# Patient Record
Sex: FEMALE | Employment: OTHER | URBAN - METROPOLITAN AREA
[De-identification: names, ages, dates, MRNs, and addresses within clinical notes are randomized per-mention and may not be internally consistent; named-entity substitution may affect disease eponyms.]

---

## 2017-02-02 ENCOUNTER — TELEPHONE (OUTPATIENT)
Dept: ONCOLOGY | Facility: CLINIC | Age: 72
End: 2017-02-02

## 2017-02-02 NOTE — Clinical Note
Cancer Risk Management  Program Locations    [facility]  [facility]  [facility]  Mailing Address  Cancer Risk Management Program  [facility]  [address]  [place] [address]    New patient appointments  [phone]  February 2, 2017    Samara Chung  [address] [place] [id number]      Dear Samara,    It was a pleasure speaking with you over the phone recently regarding your family history and genetic testing options. Here is a copy of the progress note summarizing our conversation. If you have any additional questions, please feel free to call.    2/2/2017    Referring Provider: Alli South MD     At her request, I called Samara today to readdress her family history of leukemia and genetic testing for hereditary leukemia syndromes. During her appointment on 11/22/2016 at the [facility], we discussed having Samara gather more information regarding the leukemias in her family and that I would pursue a pre-authorization for the tier 1 hereditary leukemia gene test through the [facility].    Per Samara, she did not have any updates regarding the leukemias in her family history. We discussed again that the pattern of leukemias in her family does not fit a pattern typical of the currently known hereditary leukemia syndromes, as her family members were diagnosed in either their 60's or teenage years with both chronic and acute leukemias. That being said, additional information may be available in the future that does suggest her family history could be due to a hereditary leukemia syndrome.    It may also be that her family history of cancer is familial/multifactorial, meaning the leukemias were caused by a combination of genetic and environmental risk factors. If this is the case,  Samara and her other relatives may be at small increased risk for leukemia above the general population risk. Genetic testing may or may not be available in the future that can identify these small, genetic risk factors. We briefly reviewed general lifestyle recommendations that can manage/reduce cancer risk, including healthy diet, exercise, healthy weight, limited alcohol use, no smoking, etc. She is encouraged to discuss these lifestyle factors with her primary care provider. Samara verbalized understanding.     We also reviewed the pre-authorization I had pursued for the hereditary leukemia genetic test through the [facility]. Her [facility] insurance plan follows Medicare guidelines and this type of genetic testing is not a Medicare covered service. As such, it does not appear as though insurance would cover this genetic test. Samara had several questions regarding this test and how it would change her medical care, as she was curious if it would be worth paying any out of pocket cost for the test. We discussed that per a genetic counselor at the [facility], Samara's family history of leukemia is significant enough to consider genetic testing. That being said, it is not always clear if identifying a hereditary cause for Samara's family history of leukemia would change her medical care, as several (but not all) of these syndromes do not yet have published medical management/screening guidelines. This may change in the future, so it would still be reasonable for Samara to consider genetic testing if she is interested.     Samara explained that as it does not appear as though insurance would cover this testing and there is no guarantee it would change her medical care, she felt comfortable declining genetic testing at this time. I encouraged her to contact me annually to review any new information regarding hereditary leukemia. She is also encouraged to contact me if there are any changes to her family  history, as this information may change genetic testing and/or cancer screening recommendations for Samara and her family. Samara verbalized understanding and agreement with this plan.    Skyla Dean MS, [facility]  Certified Genetic Counselor  Office: [phone]  Pager: [phone]

## 2022-04-21 NOTE — TELEPHONE ENCOUNTER
2/2/2017    At her request, I called Samara today to readdress her family history of leukemia and genetic testing for hereditary leukemia syndromes. During her appointment on 11/22/2016 at the [facility], we discussed having Samara gather more information regarding the leukemias in her family and that I would pursue a pre-authorization for the tier 1 hereditary leukemia gene test through the [facility].    Per Samara, she did not have any updates regarding the leukemias in her family history. We discussed again that the pattern of leukemias in her family does not fit a pattern typical of the currently known hereditary leukemia syndromes, as her family members were diagnosed in either their 60's or teenage years with both chronic and acute leukemias. That being said, additional information may be available in the future that does suggest her family history could be due to a hereditary leukemia syndrome. It may also be that her family history of cancer is familial/multifactorial, meaning the leukemias were caused by a combination of genetic and environmental risk factors. If this is the case, Samara and her other relatives may be at small increased risk for leukemia above the general population risk. Genetic testing may or may not be available in the future that can identify these small, genetic risk factors. We briefly reviewed general lifestyle recommendations that can manage/reduce cancer risk, including healthy diet, exercise, healthy weight, limited alcohol use, no smoking, etc. She is encouraged to discuss these lifestyle factors with her primary care provider. Samara verbalized understanding.     We also reviewed the pre-authorization I had pursued for the hereditary leukemia genetic test through the [facility]. Her [facility] insurance plan follows Medicare guidelines and this type of genetic testing is not a Medicare covered service. As such, it does not appear as though insurance would  cover this genetic test. Samara had several questions regarding this test and how it would change her medical care, as she was curious if it would be worth paying any out of pocket cost for the test. We discussed that per a genetic counselor at the [facility], Samara's family history of leukemia is significant enough to consider genetic testing. That being said, it is not always clear if identifying a hereditary cause for Samara's family history of leukemia would change her medical care, as several (but not all) of these syndromes do not yet have published medical management/screening guidelines. This may change in the future, though, so it would still be reasonable for Samara to consider genetic testing if she is interested.     Samara explained that as it does not appear as though insurance would cover this testing and there is no guarantee it would change her medical care, she felt comfortable declining genetic testing at this time. I encouraged her to contact me annually to review any new information regarding hereditary leukemia. She is also encouraged to contact me if there are any changes to her family history, as this information may change genetic testing and/or cancer screening recommendations for Samara and her family. Samara verbalized understanding and agreement with this plan.    Skyla Dean MS, [facility]  Certified Genetic Counselor  Office: [phone]  Pager: [phone]   unchanged

## 2024-08-27 ENCOUNTER — TRANSCRIBE ORDERS (OUTPATIENT)
Dept: OTHER | Age: 79
End: 2024-08-27

## 2024-08-27 DIAGNOSIS — F03.A0 MILD DEMENTIA WITHOUT BEHAVIORAL DISTURBANCE, PSYCHOTIC DISTURBANCE, MOOD DISTURBANCE, OR ANXIETY, UNSPECIFIED DEMENTIA TYPE (H): Primary | ICD-10-CM

## 2025-01-30 PROBLEM — G31.84 MILD COGNITIVE IMPAIRMENT: Status: ACTIVE | Noted: 2023-06-20

## 2025-01-30 PROBLEM — F03.90 MAJOR NEUROCOGNITIVE DISORDER (H): Status: ACTIVE | Noted: 2023-06-20

## 2025-01-30 PROBLEM — Z86.14 HISTORY OF METHICILLIN RESISTANT STAPHYLOCOCCUS AUREUS INFECTION: Status: ACTIVE | Noted: 2019-07-10

## 2025-01-30 PROBLEM — F41.1 GENERALIZED ANXIETY DISORDER: Status: ACTIVE | Noted: 2019-07-03

## 2025-01-30 PROBLEM — M85.80 OSTEOPENIA: Status: ACTIVE | Noted: 2023-06-20

## 2025-01-30 PROBLEM — E78.5 HYPERLIPIDEMIA: Status: ACTIVE | Noted: 2023-07-03

## 2025-01-30 NOTE — PROGRESS NOTES
NEUROLOGY CONSULTATION NOTE       [facility] [place]  [address] [place] [address]  Tel: [phone]  Fax: [phone]  [email or web address]     Samara Chung,  1945, MRN [id number]  PCP: Alli South  Date: 2025     ASSESSMENT & PLAN     Visit Diagnosis  Major neurocognitive disorder (H)     Likely major neurocognitive disorder due to Alzheimer's dementia  79-year-old female with HODA, eating disorder, psoriasis, polymyositis, HLD who was referred for evaluation of progressive cognitive decline.  She scored 17/30 on MoCA and previously had neuropsychological testing on 8/3/2024 that suggested major neurocognitive disorder.  Her symptoms suggest the diagnosis of Alzheimer's dementia but I have recommended:    1.  MRI brain with and without contrast with neuroQUANT  2.  Increase Aricept to 10 mg daily  3.  Lab work to include folate, HIV, homocystine, MMA, RPR, TSH, B1, B12, vitamin E and   4.  If  is elevated, I will schedule her for lumbar puncture for Alzheimer's disease evaluation  5.  If above workup is unrevealing we can consider brain PET scan.  6.  Patient was told to remain socially active, do mental exercises and eat Mediterranean diet  7.  Follow-up in 3 months.    Thank you again for this referral, please feel free to contact me if you have any questions.    Anjel Aggarwal MD  [facility]     REASON FOR CONSULTATION Dementia        HISTORY OF PRESENT ILLNESS     We have been requested by Dr. Holden to evaluate Samara Chung who is a 79 year old  female for cognitive decline    Patient is a 79-year-old female with history of HODA, eating disorder, psoriasis, polymyositis, HLD who was referred for evaluation of progressive cognitive decline.  She is accompanied by her  who reports that they started noticing some cognitive issue as far back as .  This gradually progressed and in  she started  having issues with recent and remote memory.  She was making mistakes with home finances and  took over.  She has at times left the stove on and tends to neglect cleaning of the house and at time personal hygiene.  There is no history of any gait difficulty, bladder incontinence, visual or auditory hallucinations.  She had a CT of the brain in November 2024 that did not show any acute abnormality.  Neuropsychological testing in August 2024 suggested major neurocognitive disorder with generalized anxiety disorder and OCD.  She was started on Aricept and asked to follow-up with neurology.     PROBLEM LIST   Patient Active Problem List   Diagnosis    History of poliomyelitis    Psoriasis    Eating disorder    Generalized anxiety disorder    History of methicillin resistant Staphylococcus aureus infection    Hyperlipidemia    Osteopenia    Major neurocognitive disorder (H)         PAST MEDICAL & SURGICAL HISTORY     Past Medical History:   Patient  has no past medical history on file.    Surgical History:  She  has no past surgical history on file.     SOCIAL HISTORY     Reviewed, and she  reports that she has never smoked. She has never used smokeless tobacco. She reports that she does not currently use alcohol.     FAMILY HISTORY     Reviewed, and family history includes Cancer in her brother.     ALLERGIES     Allergies   Allergen Reactions    Sulfamethoxazole-Trimethoprim      SULFAMETHOXAZOLE-TRIMETHOPRIM nausea--    Rifampin Unknown and Other (See Comments)     Comment: nausea, Description:     RIFAMPIN nausea--    Trimethoprim Other (See Comments)     Comment: Nausea, Description:     TRIMETHOPRIM nausea--    Escitalopram Unknown and Other (See Comments)     Other Reaction(s) from [facility]: confusion    + nausea / impaired confusion    Rosuvastatin Other (See Comments)     She can't remember what happened but does not want to ever take this again.    Sulfamethoxazole Other (See Comments)     Comment:  "Nausea, Description:         REVIEW OF SYSTEMS     A 12 point review of system was performed and was negative except as outlined in the history of present illness.     HOME MEDICATIONS     Current Outpatient Rx   Medication Sig Dispense Refill    B Complex Vitamins (VITAMIN B COMPLEX IJ) Take 1 tablet by mouth daily.      calcium citrate (CITRACAL) 950 (200 Ca) MG tablet Take 1 tablet by mouth 2 times daily.      donepezil (ARICEPT) 10 MG tablet Take 1 tablet (10 mg) by mouth at bedtime. 90 tablet 3    fish oil-omega-3 fatty acids 500 MG capsule Take by mouth.      sertraline (ZOLOFT) 50 MG tablet Take 50 mg by mouth daily.      Vitamin D3 (CHOLECALCIFEROL) 125 MCG (5000 UT) tablet Take by mouth daily.           PHYSICAL EXAM     Vital signs  /67 (BP Location: Left arm, Patient Position: Sitting)   Pulse 56   Ht 1.626 m (5' 4\")   Wt 67.1 kg (148 lb)   BMI 25.40 kg/m      Weight:   148 lbs 0 oz      2/5/2025    12:23 PM   MOCA   Visuospatial/Executive  4   Naming 2   Attention - Digits 1   Attention - Letters 1   Attention - Subtraction 1   Language - Repeat 2   Language - Fluency  1   Abstraction 2   Delayed Recall 0   Orientation 3   Education 0   MOCA Score 17   Administered by:  Belen BROOKS CMA       Elderly cheerful lady who is alert and oriented x 3 vital signs are reviewed and documented in electronic medical record.  Neck supple.  Neurologically speech normal cranial nerves II through XII are intact she has decreased muscle  mass with normal tone strength is 5/5 reflexes are 3+ toes downgoing.  Minimal dysmetria on finger-nose testing.  She has a wide-based gait and has some difficulty with tandem walking.     PERTINENT DIAGNOSTIC STUDIES     Following studies were reviewed:     CT BRAIN 11/29/2024  No acute intracranial pathology.     NEUROPSYCHOLOGICAL TESTING 8/3/2024  Samara was seen today for neuropsychological testing and neuropsychological assessment.  Major neurocognitive disorder " (HC)  Generalized anxiety disorder  Obsessive behavior    Patient is a 79 y.o. female who was referred for neuropsychological evaluation. These findings should be considered in combination with medical history and other findings. Please see the records for the comprehensive history.     Ms. Chung has been repeatedly evaluated over the last several years. She has worried about her memory for a number of years and initially her concerns seemed more a result of her anxiety and her obsessive and perseverative thinking. This started as far back as 2015 but at that time she was scoring the the average or above range. She continued to display very anxious behavior with a lot of cognitive rumination and over attention to detail. She would often ask the same question which appeared to be more likely related to obsessively thinking and rechecking the details rather than memory.     However, over the last couple of years there has been more indication in her testing of actual decline in her memory abilities. In July of 2023, while she scored in the BORDERLINE range of functioning overall, she demonstrated impairment in both immediate and delayed memory. In addition, she displayed impairment in executive functioning. While there is understandable test - retest variability between her scores over the years her results clearly show some decline not only in her overall score but especially in her immediate memory since she was originally evaluated in 2020. Thus, the earliest stages of a MAJOR NEUROCOGNITIVE DISORDER also known as dementia were suspected. Certainly these issues can be exacerbated by her anxiety and rumination. It was recommended she return to therapy and also to consider medication for this. However there is no indication she started therapy here and she declined to start the medication when offered by her primary care provider. Again it is noted in the record she asks things over and over in spite of just being  told them. She also did not follow through with the referral to neurology.    Today Samara Chung , scored in the Impairedrange of functioning overall on neuropsychological testing. Attention Abilities were in the Average range. Immediate Memory was in the Impaired range while Delayed Memory was Impaired. Language Abilities were Low Average/Below Average while Visual Construction Abilities were Impaired. Executive Functioning/Divided Attention was SEVERELY IMPAIRED.    These scores show significant decline from her baseline functioning. Not only is there decline noted in testing completed here in 2020/2021 but there was other prior testing completed here using the Weschler Memory Scales in 2015 and 2017 where she scored in the Average to Superior range. Thus, these results clearly show substantial gradual decline in cognitive functioning in the context of also perseveration and significant anxiety over the last 9 years.     These scores, along with the history presented, are consistent with a diagnosis of MAJOR NEUROCOGNITIVE DISORDER also known as dementia . There are many underlying diseases that can cause Major Neurocognitive Disorder and currently a definitive diagnosis cannot be made until post mortem. The most likely cause(s) in this case based on the patient's history and test results include: Alzheimer's Disease or Vascular Disease . Unfortunately she has not completed the recommended MRI that may shed some insight as to the presence of vascular disease. However, this would not substantively change treatment.    Based on these findings along with the history presented, the following recommendations are made.    Patient should follow up with primary care to discuss treatment and overall medical care. Discuss taking a medication that can slow the development of further cognitive symptoms.   Patient should not drive.  At this time it is my opinion the patient no longer has decision making capacity and her power  of  should be activated. STatement of incapacity was signed and forwarded to her provider, Dr. Holden. Social work notified.  The best way to prevent cognitive decline is to eat a health diet and stay physically and mentally active. Regular exercise such as walking is most important. Dietary suggestions can be found at: https://www.healthline.com/nutrition/mind-diet. Do activities you enjoy that involve new learning and being social. You can find such activities that you can do online at [employer].   For more information on all types of dementia and resources for families and caregivers visit the website of the Alzheimer s Association at www.alz.org.  More information on understanding dementia and communication with persons with all types of dementia can be found at: http://[employer]/resources/teepa-tips-videos    These findings and recommendations were discussed in a basic manner with the patient and her . Patients reaction was a bit of confusion but superficial polite aquiescence. Her  is aware of her deficits and denies need for assistance at this time. However, he has had some health issues and we discuss they should look into options for care should either of them need it in the future.      PERTINENT LABS  Following labs were reviewed:  No visits with results within 3 Month(s) from this visit.   Latest known visit with results is:   No results found for any previous visit.        Total time spent for face to face visit, reviewing labs/imaging studies, counseling and coordination of care was: 1 Hour spent on the date of the encounter doing chart review, review of outside records, review of test results, interpretation of tests, patient visit, and documentation     The longitudinal plan of care for the diagnosis(es)/condition(s) as documented were addressed during this visit. Due to the added complexity in care, I will continue to support Samara in the subsequent management and with  ongoing continuity of care.    This note was dictated using voice recognition software.  Any grammatical or context distortions are unintentional and inherent to the software.    Orders Placed This Encounter   Procedures    MR Brain w/o & w Contrast    Folate    HIV Antigen Antibody Combo Cascade    Homocysteine    Methylmalonic Acid    [facility]; ; PHOSPHO , Plasma (Laboratory Miscellaneous Order)    Treponema Abs w Reflex to RPR and Titer    TSH with free T4 reflex    Vitamin B1 whole blood    Vitamin B12    Vitamin E      New Prescriptions    No medications on file      Modified Medications    Modified Medication Previous Medication    DONEPEZIL (ARICEPT) 10 MG TABLET donepezil (ARICEPT) 5 MG tablet       Take 1 tablet (10 mg) by mouth at bedtime.    Take 1 tablet by mouth at bedtime.

## 2025-02-05 ENCOUNTER — OFFICE VISIT (OUTPATIENT)
Dept: NEUROLOGY | Facility: CLINIC | Age: 80
End: 2025-02-05
Payer: COMMERCIAL

## 2025-02-05 ENCOUNTER — LAB (OUTPATIENT)
Dept: LAB | Facility: HOSPITAL | Age: 80
End: 2025-02-05
Payer: COMMERCIAL

## 2025-02-05 VITALS
HEIGHT: 64 IN | WEIGHT: 148 LBS | HEART RATE: 56 BPM | DIASTOLIC BLOOD PRESSURE: 67 MMHG | SYSTOLIC BLOOD PRESSURE: 128 MMHG | BODY MASS INDEX: 25.27 KG/M2

## 2025-02-05 DIAGNOSIS — F03.90 MAJOR NEUROCOGNITIVE DISORDER (H): Primary | ICD-10-CM

## 2025-02-05 DIAGNOSIS — F03.90 MAJOR NEUROCOGNITIVE DISORDER (H): ICD-10-CM

## 2025-02-05 LAB
FOLATE SERPL-MCNC: 11.9 NG/ML (ref 4.6–34.8)
HCYS SERPL-SCNC: 12 UMOL/L (ref 0–15)
HIV 1+2 AB+HIV1 P24 AG SERPL QL IA: NONREACTIVE
Lab: NORMAL
PERFORMING LABORATORY: NORMAL
SPECIMEN STATUS: NORMAL
T PALLIDUM AB SER QL: NONREACTIVE
TEST NAME: NORMAL
TSH SERPL DL<=0.005 MIU/L-ACNC: 1.45 UIU/ML (ref 0.3–4.2)
VIT B12 SERPL-MCNC: 649 PG/ML (ref 232–1245)

## 2025-02-05 PROCEDURE — 36415 COLL VENOUS BLD VENIPUNCTURE: CPT

## 2025-02-05 PROCEDURE — 83520 IMMUNOASSAY QUANT NOS NONAB: CPT

## 2025-02-05 PROCEDURE — 87389 HIV-1 AG W/HIV-1&-2 AB AG IA: CPT

## 2025-02-05 PROCEDURE — 82746 ASSAY OF FOLIC ACID SERUM: CPT

## 2025-02-05 PROCEDURE — 99205 OFFICE O/P NEW HI 60 MIN: CPT | Performed by: PSYCHIATRY & NEUROLOGY

## 2025-02-05 PROCEDURE — 84446 ASSAY OF VITAMIN E: CPT

## 2025-02-05 PROCEDURE — 84425 ASSAY OF VITAMIN B-1: CPT

## 2025-02-05 PROCEDURE — G2211 COMPLEX E/M VISIT ADD ON: HCPCS | Performed by: PSYCHIATRY & NEUROLOGY

## 2025-02-05 PROCEDURE — 83921 ORGANIC ACID SINGLE QUANT: CPT

## 2025-02-05 PROCEDURE — 82607 VITAMIN B-12: CPT

## 2025-02-05 PROCEDURE — 84443 ASSAY THYROID STIM HORMONE: CPT

## 2025-02-05 PROCEDURE — 83090 ASSAY OF HOMOCYSTEINE: CPT

## 2025-02-05 PROCEDURE — 86780 TREPONEMA PALLIDUM: CPT

## 2025-02-05 RX ORDER — DONEPEZIL HYDROCHLORIDE 5 MG/1
1 TABLET, FILM COATED ORAL AT BEDTIME
COMMUNITY
Start: 2024-11-19 | End: 2025-02-05

## 2025-02-05 RX ORDER — OMEGA-3/DHA/EPA/FISH OIL 60 MG-90MG
CAPSULE ORAL
COMMUNITY

## 2025-02-05 RX ORDER — DONEPEZIL HYDROCHLORIDE 10 MG/1
10 TABLET, FILM COATED ORAL AT BEDTIME
Qty: 90 TABLET | Refills: 3 | Status: SHIPPED | OUTPATIENT
Start: 2025-02-05

## 2025-02-05 RX ORDER — IBUPROFEN 200 MG
1 CAPSULE ORAL 2 TIMES DAILY
COMMUNITY

## 2025-02-05 ASSESSMENT — MONTREAL COGNITIVE ASSESSMENT (MOCA)
WHAT LEVEL OF EDUCATION WAS ATTAINED: 0
VISUOSPATIAL/EXECUTIVE SUBSCORE: 4
11. FOR EACH PAIR OF WORDS, WHAT CATEGORY DO THEY BELONG TO (OUT OF 2): 2
7. [VIGILENCE] TAP WHEN HEARING DESIGNATED LETTER: 1
4. NAME EACH OF THE THREE ANIMALS SHOWN: 2
9. REPEAT EACH SENTENCE: 2
13. ORIENTATION SUBSCORE: 3
WHAT IS THE TOTAL SCORE (OUT OF 30): 17
8. SERIAL SUBTRACTION OF 7S: 1
10. [FLUENCY] NAME WORDS STARTING WITH DESIGNATED LETTER: 1
6. READ LIST OF DIGITS [FORWARD/BACKWARD]: 1
12. MEMORY INDEX SCORE: 0

## 2025-02-05 NOTE — NURSING NOTE
Chief Complaint   Patient presents with    Dementia     MoCA 17/30     Belen BROOKS CMA on 2/5/2025 at 12:39 PM  [facility]

## 2025-02-05 NOTE — PATIENT INSTRUCTIONS
Instructions for Blood Draw    Hours:   [facility]: M-F 7am-5pm, Saturday- 9am-1pm No appointment is needed.  [facility]: M-F 7am-5pm, Saturday & - 9am-12pm No appointment is needed.  Schedule Lab Appointments through [email or web address] or by [phone] ([phone])    [facility] (2nd floor) or [place] ([facility])

## 2025-02-05 NOTE — LETTER
2025      Samara Chung  [address] [address]      Dear Colleague,    Thank you for referring your patient, Samara Chung, to the [facility] NEUR[facility]INIC [place]. Please see a copy of my visit note below.    NEUROLOGY CONSULTATION NOTE       [facility] NEUROLOGY [place]  [address] [place] [address]  Tel: [phone]  Fax: [phone]  [email or web address]     Samara Chung,  1945, MRN [id number]  PCP: Alli South  Date: 2025     ASSESSMENT & PLAN     Visit Diagnosis  Major neurocognitive disorder (H)     Likely major neurocognitive disorder due to Alzheimer's dementia  79-year-old female with HODA, eating disorder, psoriasis, polymyositis, HLD who was referred for evaluation of progressive cognitive decline.  She scored 17/30 on MoCA and previously had neuropsychological testing on 8/3/2024 that suggested major neurocognitive disorder.  Her symptoms suggest the diagnosis of Alzheimer's dementia but I have recommended:    1.  MRI brain with and without contrast with neuroQUANT  2.  Increase Aricept to 10 mg daily  3.  Lab work to include folate, HIV, homocystine, MMA, RPR, TSH, B1, B12, vitamin E and   4.  If  is elevated, I will schedule her for lumbar puncture for Alzheimer's disease evaluation  5.  If above workup is unrevealing we can consider brain PET scan.  6.  Patient was told to remain socially active, do mental exercises and eat Mediterranean diet  7.  Follow-up in 3 months.    Thank you again for this referral, please feel free to contact me if you have any questions.    Anjel Aggarwal MD  [facility] NEUROLOGY, [place]     REASON FOR CONSULTATION Dementia        HISTORY OF PRESENT ILLNESS     We have been requested by Dr. Holden to evaluate Samara Chung who is a 79 year old  female for cognitive decline    Patient is a 79-year-old female with history of HODA, eating disorder,  psoriasis, polymyositis, HLD who was referred for evaluation of progressive cognitive decline.  She is accompanied by her  who reports that they started noticing some cognitive issue as far back as 2015.  This gradually progressed and in 2023 she started having issues with recent and remote memory.  She was making mistakes with home finances and  took over.  She has at times left the stove on and tends to neglect cleaning of the house and at time personal hygiene.  There is no history of any gait difficulty, bladder incontinence, visual or auditory hallucinations.  She had a CT of the brain in November 2024 that did not show any acute abnormality.  Neuropsychological testing in August 2024 suggested major neurocognitive disorder with generalized anxiety disorder and OCD.  She was started on Aricept and asked to follow-up with neurology.     PROBLEM LIST   Patient Active Problem List   Diagnosis     History of poliomyelitis     Psoriasis     Eating disorder     Generalized anxiety disorder     History of methicillin resistant Staphylococcus aureus infection     Hyperlipidemia     Osteopenia     Major neurocognitive disorder (H)         PAST MEDICAL & SURGICAL HISTORY     Past Medical History:   Patient  has no past medical history on file.    Surgical History:  She  has no past surgical history on file.     SOCIAL HISTORY     Reviewed, and she  reports that she has never smoked. She has never used smokeless tobacco. She reports that she does not currently use alcohol.     FAMILY HISTORY     Reviewed, and family history includes Cancer in her brother.     ALLERGIES     Allergies   Allergen Reactions     Sulfamethoxazole-Trimethoprim      SULFAMETHOXAZOLE-TRIMETHOPRIM nausea--     Rifampin Unknown and Other (See Comments)     Comment: nausea, Description:     RIFAMPIN nausea--     Trimethoprim Other (See Comments)     Comment: Nausea, Description:     TRIMETHOPRIM nausea--     Escitalopram Unknown and  "Other (See Comments)     Other Reaction(s) from [facility]: confusion    + nausea / impaired confusion     Rosuvastatin Other (See Comments)     She can't remember what happened but does not want to ever take this again.     Sulfamethoxazole Other (See Comments)     Comment: Nausea, Description:         REVIEW OF SYSTEMS     A 12 point review of system was performed and was negative except as outlined in the history of present illness.     HOME MEDICATIONS     Current Outpatient Rx   Medication Sig Dispense Refill     B Complex Vitamins (VITAMIN B COMPLEX IJ) Take 1 tablet by mouth daily.       calcium citrate (CITRACAL) 950 (200 Ca) MG tablet Take 1 tablet by mouth 2 times daily.       donepezil (ARICEPT) 10 MG tablet Take 1 tablet (10 mg) by mouth at bedtime. 90 tablet 3     fish oil-omega-3 fatty acids 500 MG capsule Take by mouth.       sertraline (ZOLOFT) 50 MG tablet Take 50 mg by mouth daily.       Vitamin D3 (CHOLECALCIFEROL) 125 MCG (5000 UT) tablet Take by mouth daily.           PHYSICAL EXAM     Vital signs  /67 (BP Location: Left arm, Patient Position: Sitting)   Pulse 56   Ht 1.626 m (5' 4\")   Wt 67.1 kg (148 lb)   BMI 25.40 kg/m      Weight:   148 lbs 0 oz      2/5/2025    12:23 PM   MOCA   Visuospatial/Executive  4   Naming 2   Attention - Digits 1   Attention - Letters 1   Attention - Subtraction 1   Language - Repeat 2   Language - Fluency  1   Abstraction 2   Delayed Recall 0   Orientation 3   Education 0   MOCA Score 17   Administered by:  Belen BROOKS CMA       Elderly cheerful lady who is alert and oriented x 3 vital signs are reviewed and documented in electronic medical record.  Neck supple.  Neurologically speech normal cranial nerves II through XII are intact she has decreased muscle  mass with normal tone strength is 5/5 reflexes are 3+ toes downgoing.  Minimal dysmetria on finger-nose testing.  She has a wide-based gait and has some difficulty with tandem walking.     PERTINENT " DIAGNOSTIC STUDIES     Following studies were reviewed:     CT BRAIN 11/29/2024  No acute intracranial pathology.     NEUROPSYCHOLOGICAL TESTING 8/3/2024  Samara was seen today for neuropsychological testing and neuropsychological assessment.  Major neurocognitive disorder (HC)  Generalized anxiety disorder  Obsessive behavior    Patient is a 79 y.o. female who was referred for neuropsychological evaluation. These findings should be considered in combination with medical history and other findings. Please see the records for the comprehensive history.     Ms. Chung has been repeatedly evaluated over the last several years. She has worried about her memory for a number of years and initially her concerns seemed more a result of her anxiety and her obsessive and perseverative thinking. This started as far back as 2015 but at that time she was scoring the the average or above range. She continued to display very anxious behavior with a lot of cognitive rumination and over attention to detail. She would often ask the same question which appeared to be more likely related to obsessively thinking and rechecking the details rather than memory.     However, over the last couple of years there has been more indication in her testing of actual decline in her memory abilities. In July of 2023, while she scored in the BORDERLINE range of functioning overall, she demonstrated impairment in both immediate and delayed memory. In addition, she displayed impairment in executive functioning. While there is understandable test - retest variability between her scores over the years her results clearly show some decline not only in her overall score but especially in her immediate memory since she was originally evaluated in 2020. Thus, the earliest stages of a MAJOR NEUROCOGNITIVE DISORDER also known as dementia were suspected. Certainly these issues can be exacerbated by her anxiety and rumination. It was recommended she return to  therapy and also to consider medication for this. However there is no indication she started therapy here and she declined to start the medication when offered by her primary care provider. Again it is noted in the record she asks things over and over in spite of just being told them. She also did not follow through with the referral to neurology.    Today Samara Chung , scored in the Impairedrange of functioning overall on neuropsychological testing. Attention Abilities were in the Average range. Immediate Memory was in the Impaired range while Delayed Memory was Impaired. Language Abilities were Low Average/Below Average while Visual Construction Abilities were Impaired. Executive Functioning/Divided Attention was SEVERELY IMPAIRED.    These scores show significant decline from her baseline functioning. Not only is there decline noted in testing completed here in 2020/2021 but there was other prior testing completed here using the Weschler Memory Scales in 2015 and 2017 where she scored in the Average to Superior range. Thus, these results clearly show substantial gradual decline in cognitive functioning in the context of also perseveration and significant anxiety over the last 9 years.     These scores, along with the history presented, are consistent with a diagnosis of MAJOR NEUROCOGNITIVE DISORDER also known as dementia . There are many underlying diseases that can cause Major Neurocognitive Disorder and currently a definitive diagnosis cannot be made until post mortem. The most likely cause(s) in this case based on the patient's history and test results include: Alzheimer's Disease or Vascular Disease . Unfortunately she has not completed the recommended MRI that may shed some insight as to the presence of vascular disease. However, this would not substantively change treatment.    Based on these findings along with the history presented, the following recommendations are made.    Patient should follow up  with primary care to discuss treatment and overall medical care. Discuss taking a medication that can slow the development of further cognitive symptoms.   Patient should not drive.  At this time it is my opinion the patient no longer has decision making capacity and her power of  should be activated. STatement of incapacity was signed and forwarded to her provider, Dr. Holden. Social work notified.  The best way to prevent cognitive decline is to eat a health diet and stay physically and mentally active. Regular exercise such as walking is most important. Dietary suggestions can be found at: https://www.[employer].com/nutrition/mind-diet. Do activities you enjoy that involve new learning and being social. You can find such activities that you can do online at [employer].   For more information on all types of dementia and resources for families and caregivers visit the website of the Alzheimer s Association at www.alz.org.  More information on understanding dementia and communication with persons with all types of dementia can be found at: http://[employer].Proteopure/resources/teepa-tips-videos    These findings and recommendations were discussed in a basic manner with the patient and her . Patients reaction was a bit of confusion but superficial polite aquiescence. Her  is aware of her deficits and denies need for assistance at this time. However, he has had some health issues and we discuss they should look into options for care should either of them need it in the future.      PERTINENT LABS  Following labs were reviewed:  No visits with results within 3 Month(s) from this visit.   Latest known visit with results is:   No results found for any previous visit.        Total time spent for face to face visit, reviewing labs/imaging studies, counseling and coordination of care was: 1 Hour spent on the date of the encounter doing chart review, review of outside records, review of test results,  interpretation of tests, patient visit, and documentation     The longitudinal plan of care for the diagnosis(es)/condition(s) as documented were addressed during this visit. Due to the added complexity in care, I will continue to support Samara in the subsequent management and with ongoing continuity of care.    This note was dictated using voice recognition software.  Any grammatical or context distortions are unintentional and inherent to the software.    Orders Placed This Encounter   Procedures     MR Brain w/o & w Contrast     Folate     HIV Antigen Antibody Combo Cascade     Homocysteine     Methylmalonic Acid     [facility]; ; PHOSPHO , Plasma (Laboratory Miscellaneous Order)     Treponema Abs w Reflex to RPR and Titer     TSH with free T4 reflex     Vitamin B1 whole blood     Vitamin B12     Vitamin E      New Prescriptions    No medications on file      Modified Medications    Modified Medication Previous Medication    DONEPEZIL (ARICEPT) 10 MG TABLET donepezil (ARICEPT) 5 MG tablet       Take 1 tablet (10 mg) by mouth at bedtime.    Take 1 tablet by mouth at bedtime.                Again, thank you for allowing me to participate in the care of your patient.        Sincerely,        Anjel Aggarwal MD    Electronically signed

## 2025-02-05 NOTE — NURSING NOTE
JEROMY COGNITIVE ASSESSMENT (MOCA)  Version 7.1 Original Version  VISUOSPATIAL/EXECUTIVE               COPY CUBE      [  0  ]                                [ 1  ] DRAW CLOCK (Ten past eleven)  (3 points)    [ 1   ]                    [ 1   ]               [   1 ]       Contour            Numbers     Hands POINTS           3        / 5   NAMING    [  1 ]                                                                        [ 0   ]                                             [   1 ]  Limadyson Weeks                                Camel                2     / 3   MEMORY Read list of words, subject must repeat them. Do 2 trials, even if 1st trial is successful. Do a recall after 5 minutes  FACE VELVET Taoist LYNNETTE RED No Points    1st x x x  x     2nd  x x x x    ATTENTION Read list of digits (1 digit/sec) Subject has to repeat in the forward order       [  1  ]   2  1  8  5  4                                [  0  ] 7 4 2                      1    /2   Read list of letters. The subject must tap with his hand at each letter A. No points if > 2 errors.  [  1  ] F B A C M N A A J K L B A F A K D E A A A J A M O F A A B          1    /1   Serial 7 subtraction starting at 100          [ 1   ] 93         [    ] 86          [    ] 79          [    ] 72         [    ] 65   4 or 5 correct subtractions: 3 points,  2 or 3 correct: 2 points,  1correct: 1 point,   0 correct: 0 points       1     /3   LANGUAGE Repeat: I only know that Yo is the one to help today. [  1   ]                                      The cat always hid under the couch when dogs were in the room. [ 1  ]          2     /2   Fluency: Name maximum number of words in one minute that begin with the letter F                                                                                                                    [ 1   ] _11__ (N > 11 words)        1 /1   ABSTRACTION  Similarity between e.g. banana-orange=fruit                                                                   [  1  ] train-bicycle                      [1    ] watch-ruler           2   /2   DELAYED  RECALL Has to recall words  WITH NO CUE FACE  [ 0   ] VELVET  [  0  ] Yarsani  [   0 ]  LYNNETTE  [ 0   ] RED  [ 0   ] Points for UNCUED recall only       0     /5           OPTIONAL Category cue           Multiple choice cue          ORIENTATION  [  0  ] Date     [  1  ] Month       [  1  ] Year      [  0  ] Day      [ 1   ] Place        [ 0   ] City     3     /6   TOTAL  Normal > 26/30 Add 1 point if < 12 years education     17 /30

## 2025-02-06 LAB — MAYO MISC RESULT: ABNORMAL

## 2025-02-08 LAB
A-TOCOPHEROL VIT E SERPL-MCNC: 14.7 MG/L
BETA+GAMMA TOCOPHEROL SERPL-MCNC: 0.4 MG/L

## 2025-02-09 LAB — VIT B1 PYROPHOSHATE BLD-SCNC: 171 NMOL/L

## 2025-02-12 ENCOUNTER — TELEPHONE (OUTPATIENT)
Dept: NEUROLOGY | Facility: CLINIC | Age: 80
End: 2025-02-12
Payer: COMMERCIAL

## 2025-02-12 LAB — METHYLMALONATE SERPL-SCNC: 0.21 UMOL/L (ref 0–0.4)

## 2025-02-12 NOTE — TELEPHONE ENCOUNTER
RN returned call to Artem, patients spouse (ctc on file). He reports that MRI brain is scheduled for 2/24. Asking about recommended timeframe for follow up, RN recommended 3 months per LOV note. Artem will return call to clinic to schedule follow up.     He is asking about recommendation for increasing Aricept to 10mg, RN advised that medication's goal is to preserve current state of cognition but that pt will still eventually have decline. He verbalizes understanding. RN advised to watch for GI upset- nausea, vomiting, diarrhea and report to clinic if she is having symptoms that are persistent/affecting her daily. Artem verbalizes understanding.      Eric QUEVEDO RN, BSN  [facility] Neurology

## 2025-02-12 NOTE — TELEPHONE ENCOUNTER
Health Call Center    Phone Message    May a detailed message be left on voicemail: yes     Reason for Call: Other:       Patient's , Artem, requesting call back from care team or Dr Aggarwal. Artem states he has questions about most recent lab results, an MRI and medication questions.   Artem's call back #[phone]    Action Taken: Message routed to:  Other: [facility]    Travel Screening: Not Applicable

## 2025-02-13 DIAGNOSIS — F03.90 MAJOR NEUROCOGNITIVE DISORDER (H): Primary | ICD-10-CM

## 2025-02-13 NOTE — TELEPHONE ENCOUNTER
Spoke with Artem and scheduled follow up for 3/5/25 with Dr. Alessia BROOKS CMA on 2/13/2025 at 2:31 PM  [facility]

## 2025-02-13 NOTE — TELEPHONE ENCOUNTER
Anjel Aggarwal MD  [facility]  Cc: Belen Mendiola, [facility]   is elevated.  As we had discussed during your last visit, after MRI scan we need a lumbar puncture to look for changes in the spinal fluid usually seen in patients with Alzheimer's dementia.

## 2025-02-13 NOTE — TELEPHONE ENCOUNTER
Returned call to Artem, relayed provider note below. He verbalizes understanding. Would like to defer LP.     Patient scheduled 2/24 for MRI.     Artem asking for assistance in scheduling follow up with Dr. Aggarwal. Tiff can you assist in scheduling this?     Eric QUEVEDO, RN, BSN  [facility] Neurology

## 2025-02-13 NOTE — TELEPHONE ENCOUNTER
It will help confirm the diagnosis of Alzheimer's dementia.  Clinically I strongly suspect we are dealing with Alzheimer's dementia and therefore had recommended increasing the dose of Aricept to 10 mg daily during the last visit.  If they want to hold off on lumbar puncture it is okay for neurology standpoint

## 2025-02-13 NOTE — TELEPHONE ENCOUNTER
"RN returned call to Artem, patients spouse. We discussed elevated  level and recommendation for lumbar puncture. He is asking if they can defer this testing, stating \"is this really going to tell us anything we don't already know or assume.\" He would like to minimize testing as able.     Please advise.     Eric QUEVEDO RN, BSN  [facility] Neurology     "

## 2025-02-13 NOTE — RESULT ENCOUNTER NOTE
is elevated.  As we had discussed during your last visit, after MRI scan we need a lumbar puncture to look for changes in the spinal fluid usually seen in patients with Alzheimer's dementia.

## 2025-02-18 PROBLEM — F02.80 MAJOR NEUROCOGNITIVE DISORDER DUE TO ALZHEIMER DISEASE (H): Status: ACTIVE | Noted: 2023-06-20

## 2025-02-18 PROBLEM — G30.9 MAJOR NEUROCOGNITIVE DISORDER DUE TO ALZHEIMER DISEASE (H): Status: ACTIVE | Noted: 2023-06-20

## 2025-03-05 NOTE — PROGRESS NOTES
NEUROLOGY FOLLOW UP VISIT  NOTE       [facility] NEUROLOGY [place]  [address] [place] [address]  Tel: [phone]  Fax: [phone]  [email or web address]     Samara Chung,  1945, MRN [id number]  PCP: Ellie Marcus MD  Date: 2025      ASSESSMENT & PLAN     Visit Diagnosis  Major neurocognitive disorder due to Alzheimer disease (H)     Major neurocognitive disorder due to Alzheimer's dementia  79-year-old female with HODA, eating disorder, psoriasis, polymyositis, HLD was evaluated for progressive cognitive decline.  Workup including MRI brain with neuroQUANT, neuropsychological testing and  suggest the diagnosis of major neurocognitive disorder due to Alzheimer's dementia.  Rest of the lab work was normal.  Although I had recommended a lumbar puncture for Alzheimer's disease evaluation,  wanted to hold off as he felt it will not add any meaningful information.  I have recommended:    1.  Continue Aricept 10 mg daily  2.  Add vitamin E 1000 unit twice daily  3.  Mediterranean diet  4.  Patient was told to remain socially active to mental exercises  5.  Follow-up in 1 year    Thank you again for this referral, please feel free to contact me if you have any questions.    Anjel Aggarwal MD  [facility]     HISTORY OF PRESENT ILLNESS     Patient is 79-year-old female with HODA, eating disorder, psoriasis, polymyositis, HLD who was initially evaluated on 2025 for progressive cognitive decline.  She scored 17/30 on MoCA and previous neuropsychological testing suggested major neurocognitive disorder.  She had MRI of the brain on 2025 with neuroQUANT that suggested generalized atrophy.  Lab work included normal HIV, folate, homocystine, MMA, RPR, TSH, B1, B12, vitamin E.   was elevated and I had recommended lumbar puncture but family wanted to defer as they did not think it will add anything meaningful that we already do not  know.  As I strongly suspected Alzheimer's dementia I told them it is okay to hold off on a lumbar puncture.  Family reports ongoing difficulty with her memory she tends to struggle with short-term memory.  She has not done anything that puts her other people at risk.     PROBLEM LIST   Patient Active Problem List   Diagnosis    History of poliomyelitis    Psoriasis    Eating disorder    Generalized anxiety disorder    History of methicillin resistant Staphylococcus aureus infection    Hyperlipidemia    Osteopenia    Major neurocognitive disorder due to Alzheimer disease (H)         PAST MEDICAL & SURGICAL HISTORY     Past Medical History:   Patient  has no past medical history on file.    Surgical History:  She  has no past surgical history on file.     SOCIAL HISTORY     Reviewed, and she  reports that she has never smoked. She has never used smokeless tobacco. She reports that she does not currently use alcohol.     FAMILY HISTORY     Reviewed, and family history includes Cancer in her brother.     ALLERGIES     Allergies   Allergen Reactions    Sulfamethoxazole-Trimethoprim      SULFAMETHOXAZOLE-TRIMETHOPRIM nausea--    Rifampin Unknown and Other (See Comments)     Comment: nausea, Description:     RIFAMPIN nausea--    Trimethoprim Other (See Comments)     Comment: Nausea, Description:     TRIMETHOPRIM nausea--    Escitalopram Unknown and Other (See Comments)     Other Reaction(s) from [facility]: confusion    + nausea / impaired confusion    Rosuvastatin Other (See Comments)     She can't remember what happened but does not want to ever take this again.    Sulfamethoxazole Other (See Comments)     Comment: Nausea, Description:         REVIEW OF SYSTEMS     A 12 point review of system was performed and was negative except as outlined in the history of present illness.     HOME MEDICATIONS     Current Outpatient Rx   Medication Sig Dispense Refill    B Complex Vitamins (VITAMIN B COMPLEX IJ) Take 1 tablet by  "mouth daily.      calcium citrate (CITRACAL) 950 (200 Ca) MG tablet Take 1 tablet by mouth 2 times daily.      donepezil (ARICEPT) 10 MG tablet Take 1 tablet (10 mg) by mouth at bedtime. 90 tablet 3    fish oil-omega-3 fatty acids 500 MG capsule Take by mouth.      sertraline (ZOLOFT) 50 MG tablet Take 50 mg by mouth daily.      Vitamin D3 (CHOLECALCIFEROL) 125 MCG (5000 UT) tablet Take by mouth daily.      vitamin E (TOCOPHEROL) 1000 units (450 mg) CAPS capsule Take 1 capsule (1,000 Units) by mouth 2 times daily. 60 capsule 11         PHYSICAL EXAM     Vital signs  /73 (BP Location: Left arm, Patient Position: Sitting)   Pulse 53   Ht 1.626 m (5' 4\")   Wt 67.1 kg (148 lb)   BMI 25.40 kg/m      Weight:   148 lbs 0 oz    Elderly cheerful lady who is alert and oriented x 3 vital signs are reviewed and documented in electronic medical record. Neck supple. Neurologically speech normal cranial nerves II through XII are intact she has decreased muscle mass with normal tone strength is 5/5 reflexes are 3+ toes downgoing. Minimal dysmetria on finger-nose testing. She has a wide-based gait and has some difficulty with tandem walking.      PERTINENT DIAGNOSTIC STUDIES     Following studies were reviewed:     CT BRAIN 11/29/2024  No acute intracranial pathology.      MRI BRAIN 2/24/2025  1. Mild small vessel ischemic white matter signal changes and cerebral volume loss.  2. Otherwise normal brain exam.    NEUROPSYCHOLOGICAL TESTING 8/3/2024  Samara was seen today for neuropsychological testing and neuropsychological assessment.  Major neurocognitive disorder (HC)  Generalized anxiety disorder  Obsessive behavior  Patient is a 79 y.o. female who was referred for neuropsychological evaluation. These findings should be considered in combination with medical history and other findings. Please see the records for the comprehensive history.     Ms. Chung has been repeatedly evaluated over the last several years. She has " worried about her memory for a number of years and initially her concerns seemed more a result of her anxiety and her obsessive and perseverative thinking. This started as far back as 2015 but at that time she was scoring the the average or above range. She continued to display very anxious behavior with a lot of cognitive rumination and over attention to detail. She would often ask the same question which appeared to be more likely related to obsessively thinking and rechecking the details rather than memory.     However, over the last couple of years there has been more indication in her testing of actual decline in her memory abilities. In July of 2023, while she scored in the BORDERLINE range of functioning overall, she demonstrated impairment in both immediate and delayed memory. In addition, she displayed impairment in executive functioning. While there is understandable test - retest variability between her scores over the years her results clearly show some decline not only in her overall score but especially in her immediate memory since she was originally evaluated in 2020. Thus, the earliest stages of a MAJOR NEUROCOGNITIVE DISORDER also known as dementia were suspected. Certainly these issues can be exacerbated by her anxiety and rumination. It was recommended she return to therapy and also to consider medication for this. However there is no indication she started therapy here and she declined to start the medication when offered by her primary care provider. Again it is noted in the record she asks things over and over in spite of just being told them. She also did not follow through with the referral to neurology.    Today Samara Chung , scored in the Impairedrange of functioning overall on neuropsychological testing. Attention Abilities were in the Average range. Immediate Memory was in the Impaired range while Delayed Memory was Impaired. Language Abilities were Low Average/Below Average while  Visual Construction Abilities were Impaired. Executive Functioning/Divided Attention was SEVERELY IMPAIRED.    These scores show significant decline from her baseline functioning. Not only is there decline noted in testing completed here in 2020/2021 but there was other prior testing completed here using the Weschler Memory Scales in 2015 and 2017 where she scored in the Average to Superior range. Thus, these results clearly show substantial gradual decline in cognitive functioning in the context of also perseveration and significant anxiety over the last 9 years.     These scores, along with the history presented, are consistent with a diagnosis of MAJOR NEUROCOGNITIVE DISORDER also known as dementia . There are many underlying diseases that can cause Major Neurocognitive Disorder and currently a definitive diagnosis cannot be made until post mortem. The most likely cause(s) in this case based on the patient's history and test results include: Alzheimer's Disease or Vascular Disease . Unfortunately she has not completed the recommended MRI that may shed some insight as to the presence of vascular disease. However, this would not substantively change treatment.    Based on these findings along with the history presented, the following recommendations are made.    Patient should follow up with primary care to discuss treatment and overall medical care. Discuss taking a medication that can slow the development of further cognitive symptoms.   Patient should not drive.  At this time it is my opinion the patient no longer has decision making capacity and her power of  should be activated. STatement of incapacity was signed and forwarded to her provider, Dr. Holden. Social work notified.  The best way to prevent cognitive decline is to eat a health diet and stay physically and mentally active. Regular exercise such as walking is most important. Dietary suggestions can be found at:  https://www.[employer].com/nutrition/mind-diet. Do activities you enjoy that involve new learning and being social. You can find such activities that you can do online at [employer].   For more information on all types of dementia and resources for families and caregivers visit the website of the Alzheimer s Association at www.alz.org.  More information on understanding dementia and communication with persons with all types of dementia can be found at: http://[employer].com/resources/teepa-tips-videos    These findings and recommendations were discussed in a basic manner with the patient and her . Patients reaction was a bit of confusion but superficial polite aquiescence. Her  is aware of her deficits and denies need for assistance at this time. However, he has had some health issues and we discuss they should look into options for care should either of them need it in the future.      PERTINENT LABS  Following labs were reviewed:  ; PHOSPHO , Plasma [place] Miscellaneous Test (02/05/2025 1:42 PM)     Lab on 02/05/2025   Component Date Value Ref Range Status    Folic Acid 02/05/2025 11.9  4.6 - 34.8 ng/mL Final    HIV Antigen Antibody Combo 02/05/2025 Nonreactive  Nonreactive Final    Homocysteine 02/05/2025 12.0  0.0 - 15.0 umol/L Final    Methylmalonic Acid 02/05/2025 0.21  0.00 - 0.40 umol/L Final    Specimen Status 02/05/2025 Specimen received. Reordered and sent to performing laboratory. Report to follow upon completion.   Final    Performing Laboratory 02/05/2025 [facility] [employer]   Final    Test Name 02/05/2025 Phospho-Tau 217, Plasma   Final    Test Code 02/05/2025    Final    Treponema Antibody Total 02/05/2025 Nonreactive  Nonreactive Final    TSH 02/05/2025 1.45  0.30 - 4.20 uIU/mL Final    Vitamin B1 Whole Blood Level 02/05/2025 171  70 - 180 nmol/L Final    Vitamin B12 02/05/2025 649  232 - 1,245 pg/mL Final    Vitamin E 02/05/2025 14.7  5.5 - 18.0 mg/L Final     Vitamin E Gamma 02/05/2025 0.4  0.0 - 6.0 mg/L Final    Bosque Result 02/05/2025 SEE NOTE (A)   Final         Total time spent for face to face visit, reviewing labs/imaging studies, counseling and coordination of care was: 45 Minutes spent on the date of the encounter doing chart review, review of outside records, review of test results, interpretation of tests, patient visit, and documentation     The longitudinal plan of care for the diagnosis(es)/condition(s) as documented were addressed during this visit. Due to the added complexity in care, I will continue to support Samara in the subsequent management and with ongoing continuity of care.    This note was dictated using voice recognition software.  Any grammatical or context distortions are unintentional and inherent to the software.    No orders of the defined types were placed in this encounter.     New Prescriptions    VITAMIN E (TOCOPHEROL) 1000 UNITS (450 MG) CAPS CAPSULE    Take 1 capsule (1,000 Units) by mouth 2 times daily.     Modified Medications    No medications on file

## 2025-03-19 ENCOUNTER — OFFICE VISIT (OUTPATIENT)
Dept: NEUROLOGY | Facility: CLINIC | Age: 80
End: 2025-03-19
Payer: COMMERCIAL

## 2025-03-19 VITALS
WEIGHT: 148 LBS | HEIGHT: 64 IN | BODY MASS INDEX: 25.27 KG/M2 | SYSTOLIC BLOOD PRESSURE: 135 MMHG | DIASTOLIC BLOOD PRESSURE: 73 MMHG | HEART RATE: 53 BPM

## 2025-03-19 DIAGNOSIS — G30.9 MAJOR NEUROCOGNITIVE DISORDER DUE TO ALZHEIMER DISEASE (H): Primary | ICD-10-CM

## 2025-03-19 DIAGNOSIS — F02.80 MAJOR NEUROCOGNITIVE DISORDER DUE TO ALZHEIMER DISEASE (H): Primary | ICD-10-CM

## 2025-03-19 PROCEDURE — 99214 OFFICE O/P EST MOD 30 MIN: CPT | Performed by: PSYCHIATRY & NEUROLOGY

## 2025-03-19 PROCEDURE — G2211 COMPLEX E/M VISIT ADD ON: HCPCS | Performed by: PSYCHIATRY & NEUROLOGY

## 2025-03-19 PROCEDURE — 3075F SYST BP GE 130 - 139MM HG: CPT | Performed by: PSYCHIATRY & NEUROLOGY

## 2025-03-19 PROCEDURE — 3078F DIAST BP <80 MM HG: CPT | Performed by: PSYCHIATRY & NEUROLOGY

## 2025-03-19 RX ORDER — MULTIVIT WITH MINERALS/LUTEIN
1000 TABLET ORAL 2 TIMES DAILY
Qty: 60 CAPSULE | Refills: 11 | Status: SHIPPED | OUTPATIENT
Start: 2025-03-19

## 2025-03-19 NOTE — LETTER
3/19/2025      Samara Chung  [address] [place] [id number]      Dear Colleague,    Thank you for referring your patient, Samara Chung, to the [facility]. Please see a copy of my visit note below.    NEUROLOGY FOLLOW UP VISIT  NOTE       [facility]  [address] [place] [address]  Tel: [phone]  Fax: [phone]  [email or web address]     Samara Chung,  1945, MRN [id number]  PCP: .Ellie Holden MD  Date: 2025      ASSESSMENT & PLAN     Visit Diagnosis  Major neurocognitive disorder due to Alzheimer disease (H)     Major neurocognitive disorder due to Alzheimer's dementia  79-year-old female with HODA, eating disorder, psoriasis, polymyositis, HLD was evaluated for progressive cognitive decline.  Workup including MRI brain with neuroQUANT, neuropsychological testing and  suggest the diagnosis of major neurocognitive disorder due to Alzheimer's dementia.  Rest of the lab work was normal.  Although I had recommended a lumbar puncture for Alzheimer's disease evaluation,  wanted to hold off as he felt it will not add any meaningful information.  I have recommended:    1.  Continue Aricept 10 mg daily  2.  Add vitamin E 1000 unit twice daily  3.  Mediterranean diet  4.  Patient was told to remain socially active to mental exercises  5.  Follow-up in 1 year    Thank you again for this referral, please feel free to contact me if you have any questions.    Anjel Aggarwal MD  [facility]     HISTORY OF PRESENT ILLNESS     Patient is 79-year-old female with HODA, eating disorder, psoriasis, polymyositis, HLD who was initially evaluated on 2025 for progressive cognitive decline.  She scored 17/30 on MoCA and previous neuropsychological testing suggested major neurocognitive disorder.  She had MRI of the brain on 2025 with neuroQUANT that suggested  generalized atrophy.  Lab work included normal HIV, folate, homocystine, MMA, RPR, TSH, B1, B12, vitamin E.   was elevated and I had recommended lumbar puncture but family wanted to defer as they did not think it will add anything meaningful that we already do not know.  As I strongly suspected Alzheimer's dementia I told them it is okay to hold off on a lumbar puncture.  Family reports ongoing difficulty with her memory she tends to struggle with short-term memory.  She has not done anything that puts her other people at risk.     PROBLEM LIST   Patient Active Problem List   Diagnosis     History of poliomyelitis     Psoriasis     Eating disorder     Generalized anxiety disorder     History of methicillin resistant Staphylococcus aureus infection     Hyperlipidemia     Osteopenia     Major neurocognitive disorder due to Alzheimer disease (H)         PAST MEDICAL & SURGICAL HISTORY     Past Medical History:   Patient  has no past medical history on file.    Surgical History:  She  has no past surgical history on file.     SOCIAL HISTORY     Reviewed, and she  reports that she has never smoked. She has never used smokeless tobacco. She reports that she does not currently use alcohol.     FAMILY HISTORY     Reviewed, and family history includes Cancer in her brother.     ALLERGIES     Allergies   Allergen Reactions     Sulfamethoxazole-Trimethoprim      SULFAMETHOXAZOLE-TRIMETHOPRIM nausea--     Rifampin Unknown and Other (See Comments)     Comment: nausea, Description:     RIFAMPIN nausea--     Trimethoprim Other (See Comments)     Comment: Nausea, Description:     TRIMETHOPRIM nausea--     Escitalopram Unknown and Other (See Comments)     Other Reaction(s) from [facility]: confusion    + nausea / impaired confusion     Rosuvastatin Other (See Comments)     She can't remember what happened but does not want to ever take this again.     Sulfamethoxazole Other (See Comments)     Comment: Nausea, Description:  "        REVIEW OF SYSTEMS     A 12 point review of system was performed and was negative except as outlined in the history of present illness.     HOME MEDICATIONS     Current Outpatient Rx   Medication Sig Dispense Refill     B Complex Vitamins (VITAMIN B COMPLEX IJ) Take 1 tablet by mouth daily.       calcium citrate (CITRACAL) 950 (200 Ca) MG tablet Take 1 tablet by mouth 2 times daily.       donepezil (ARICEPT) 10 MG tablet Take 1 tablet (10 mg) by mouth at bedtime. 90 tablet 3     fish oil-omega-3 fatty acids 500 MG capsule Take by mouth.       sertraline (ZOLOFT) 50 MG tablet Take 50 mg by mouth daily.       Vitamin D3 (CHOLECALCIFEROL) 125 MCG (5000 UT) tablet Take by mouth daily.       vitamin E (TOCOPHEROL) 1000 units (450 mg) CAPS capsule Take 1 capsule (1,000 Units) by mouth 2 times daily. 60 capsule 11         PHYSICAL EXAM     Vital signs  /73 (BP Location: Left arm, Patient Position: Sitting)   Pulse 53   Ht 1.626 m (5' 4\")   Wt 67.1 kg (148 lb)   BMI 25.40 kg/m      Weight:   148 lbs 0 oz    Elderly cheerful lady who is alert and oriented x 3 vital signs are reviewed and documented in electronic medical record. Neck supple. Neurologically speech normal cranial nerves II through XII are intact she has decreased muscle mass with normal tone strength is 5/5 reflexes are 3+ toes downgoing. Minimal dysmetria on finger-nose testing. She has a wide-based gait and has some difficulty with tandem walking.      PERTINENT DIAGNOSTIC STUDIES     Following studies were reviewed:     CT BRAIN 11/29/2024  No acute intracranial pathology.      MRI BRAIN 2/24/2025  1. Mild small vessel ischemic white matter signal changes and cerebral volume loss.  2. Otherwise normal brain exam.    NEUROPSYCHOLOGICAL TESTING 8/3/2024  Samara was seen today for neuropsychological testing and neuropsychological assessment.  Major neurocognitive disorder (HC)  Generalized anxiety disorder  Obsessive behavior  Patient is a 79 " y.o. female who was referred for neuropsychological evaluation. These findings should be considered in combination with medical history and other findings. Please see the records for the comprehensive history.     Ms. Chung has been repeatedly evaluated over the last several years. She has worried about her memory for a number of years and initially her concerns seemed more a result of her anxiety and her obsessive and perseverative thinking. This started as far back as 2015 but at that time she was scoring the the average or above range. She continued to display very anxious behavior with a lot of cognitive rumination and over attention to detail. She would often ask the same question which appeared to be more likely related to obsessively thinking and rechecking the details rather than memory.     However, over the last couple of years there has been more indication in her testing of actual decline in her memory abilities. In July of 2023, while she scored in the BORDERLINE range of functioning overall, she demonstrated impairment in both immediate and delayed memory. In addition, she displayed impairment in executive functioning. While there is understandable test - retest variability between her scores over the years her results clearly show some decline not only in her overall score but especially in her immediate memory since she was originally evaluated in 2020. Thus, the earliest stages of a MAJOR NEUROCOGNITIVE DISORDER also known as dementia were suspected. Certainly these issues can be exacerbated by her anxiety and rumination. It was recommended she return to therapy and also to consider medication for this. However there is no indication she started therapy here and she declined to start the medication when offered by her primary care provider. Again it is noted in the record she asks things over and over in spite of just being told them. She also did not follow through with the referral to  neurology.    Today Samara Chung , scored in the Impairedrange of functioning overall on neuropsychological testing. Attention Abilities were in the Average range. Immediate Memory was in the Impaired range while Delayed Memory was Impaired. Language Abilities were Low Average/Below Average while Visual Construction Abilities were Impaired. Executive Functioning/Divided Attention was SEVERELY IMPAIRED.    These scores show significant decline from her baseline functioning. Not only is there decline noted in testing completed here in 2020/2021 but there was other prior testing completed here using the Weschler Memory Scales in 2015 and 2017 where she scored in the Average to Superior range. Thus, these results clearly show substantial gradual decline in cognitive functioning in the context of also perseveration and significant anxiety over the last 9 years.     These scores, along with the history presented, are consistent with a diagnosis of MAJOR NEUROCOGNITIVE DISORDER also known as dementia . There are many underlying diseases that can cause Major Neurocognitive Disorder and currently a definitive diagnosis cannot be made until post mortem. The most likely cause(s) in this case based on the patient's history and test results include: Alzheimer's Disease or Vascular Disease . Unfortunately she has not completed the recommended MRI that may shed some insight as to the presence of vascular disease. However, this would not substantively change treatment.    Based on these findings along with the history presented, the following recommendations are made.    Patient should follow up with primary care to discuss treatment and overall medical care. Discuss taking a medication that can slow the development of further cognitive symptoms.   Patient should not drive.  At this time it is my opinion the patient no longer has decision making capacity and her power of  should be activated. STatement of incapacity was  signed and forwarded to her provider, Dr. Holden. Social work notified.  The best way to prevent cognitive decline is to eat a health diet and stay physically and mentally active. Regular exercise such as walking is most important. Dietary suggestions can be found at: https://www.healthline.com/nutrition/mind-diet. Do activities you enjoy that involve new learning and being social. You can find such activities that you can do online at [employer].   For more information on all types of dementia and resources for families and caregivers visit the website of the Alzheimer s Association at www.alz.org.  More information on understanding dementia and communication with persons with all types of dementia can be found at: http://Plann[employer].beenz.com/resources/teepa-tips-videos    These findings and recommendations were discussed in a basic manner with the patient and her . Patients reaction was a bit of confusion but superficial polite aquiescence. Her  is aware of her deficits and denies need for assistance at this time. However, he has had some health issues and we discuss they should look into options for care should either of them need it in the future.      PERTINENT LABS  Following labs were reviewed:  ; PHOSPHO , Plasma [place] Miscellaneous Test (02/05/2025 1:42 PM)     Lab on 02/05/2025   Component Date Value Ref Range Status     Folic Acid 02/05/2025 11.9  4.6 - 34.8 ng/mL Final     HIV Antigen Antibody Combo 02/05/2025 Nonreactive  Nonreactive Final     Homocysteine 02/05/2025 12.0  0.0 - 15.0 umol/L Final     Methylmalonic Acid 02/05/2025 0.21  0.00 - 0.40 umol/L Final     Specimen Status 02/05/2025 Specimen received. Reordered and sent to performing laboratory. Report to follow upon completion.   Final     Performing Laboratory 02/05/2025 [facility]   Final     Test Name 02/05/2025 Phospho-Tau 217, Plasma   Final     Test Code 02/05/2025    Final     Treponema Antibody  Total 02/05/2025 Nonreactive  Nonreactive Final     TSH 02/05/2025 1.45  0.30 - 4.20 uIU/mL Final     Vitamin B1 Whole Blood Level 02/05/2025 171  70 - 180 nmol/L Final     Vitamin B12 02/05/2025 649  232 - 1,245 pg/mL Final     Vitamin E 02/05/2025 14.7  5.5 - 18.0 mg/L Final     Vitamin E Gamma 02/05/2025 0.4  0.0 - 6.0 mg/L Final     [place] Result 02/05/2025 SEE NOTE (A)   Final         Total time spent for face to face visit, reviewing labs/imaging studies, counseling and coordination of care was: 45 Minutes spent on the date of the encounter doing chart review, review of outside records, review of test results, interpretation of tests, patient visit, and documentation     The longitudinal plan of care for the diagnosis(es)/condition(s) as documented were addressed during this visit. Due to the added complexity in care, I will continue to support Samara in the subsequent management and with ongoing continuity of care.    This note was dictated using voice recognition software.  Any grammatical or context distortions are unintentional and inherent to the software.    No orders of the defined types were placed in this encounter.     New Prescriptions    VITAMIN E (TOCOPHEROL) 1000 UNITS (450 MG) CAPS CAPSULE    Take 1 capsule (1,000 Units) by mouth 2 times daily.     Modified Medications    No medications on file                 Again, thank you for allowing me to participate in the care of your patient.        Sincerely,        Anjel Aggarwal MD    Electronically signed

## 2025-03-19 NOTE — NURSING NOTE
Chief Complaint   Patient presents with    Dementia     Discuss test results and next steps      Belen BROOKS CMA on 3/19/2025 at 11:53 AM  [facility]